# Patient Record
Sex: MALE | URBAN - METROPOLITAN AREA
[De-identification: names, ages, dates, MRNs, and addresses within clinical notes are randomized per-mention and may not be internally consistent; named-entity substitution may affect disease eponyms.]

---

## 2024-06-20 ENCOUNTER — LAB REQUISITION (OUTPATIENT)
Dept: LAB | Facility: HOSPITAL | Age: 74
End: 2024-06-20
Payer: COMMERCIAL

## 2024-06-20 DIAGNOSIS — Z79.01 LONG TERM (CURRENT) USE OF ANTICOAGULANTS: ICD-10-CM

## 2024-06-20 LAB
INR PPP: 3 (ref 0.9–1.1)
PROTHROMBIN TIME: 34.7 SECONDS (ref 9.8–12.8)

## 2024-06-20 PROCEDURE — 85610 PROTHROMBIN TIME: CPT | Mod: OUT | Performed by: INTERNAL MEDICINE

## 2024-10-21 ENCOUNTER — HOSPITAL ENCOUNTER (EMERGENCY)
Facility: HOSPITAL | Age: 74
Discharge: HOME | End: 2024-10-21
Attending: STUDENT IN AN ORGANIZED HEALTH CARE EDUCATION/TRAINING PROGRAM
Payer: COMMERCIAL

## 2024-10-21 ENCOUNTER — APPOINTMENT (OUTPATIENT)
Dept: CARDIOLOGY | Facility: HOSPITAL | Age: 74
End: 2024-10-21
Payer: COMMERCIAL

## 2024-10-21 ENCOUNTER — APPOINTMENT (OUTPATIENT)
Dept: RADIOLOGY | Facility: HOSPITAL | Age: 74
End: 2024-10-21
Payer: COMMERCIAL

## 2024-10-21 VITALS
BODY MASS INDEX: 27.09 KG/M2 | HEIGHT: 72 IN | WEIGHT: 200 LBS | DIASTOLIC BLOOD PRESSURE: 89 MMHG | RESPIRATION RATE: 18 BRPM | SYSTOLIC BLOOD PRESSURE: 156 MMHG | HEART RATE: 90 BPM | TEMPERATURE: 98.1 F | OXYGEN SATURATION: 98 %

## 2024-10-21 DIAGNOSIS — R53.1 GENERALIZED WEAKNESS: ICD-10-CM

## 2024-10-21 DIAGNOSIS — N30.00 ACUTE CYSTITIS WITHOUT HEMATURIA: Primary | ICD-10-CM

## 2024-10-21 LAB
ALBUMIN SERPL BCP-MCNC: 4.1 G/DL (ref 3.4–5)
ALP SERPL-CCNC: 71 U/L (ref 33–136)
ALT SERPL W P-5'-P-CCNC: 17 U/L (ref 10–52)
ANION GAP SERPL CALC-SCNC: 10 MMOL/L (ref 10–20)
APPEARANCE UR: CLEAR
APTT PPP: 51 SECONDS (ref 27–38)
AST SERPL W P-5'-P-CCNC: 18 U/L (ref 9–39)
BASOPHILS # BLD AUTO: 0.06 X10*3/UL (ref 0–0.1)
BASOPHILS NFR BLD AUTO: 0.8 %
BILIRUB SERPL-MCNC: 1.1 MG/DL (ref 0–1.2)
BILIRUB UR STRIP.AUTO-MCNC: NEGATIVE MG/DL
BUN SERPL-MCNC: 17 MG/DL (ref 6–23)
CALCIUM SERPL-MCNC: 9.4 MG/DL (ref 8.6–10.3)
CARDIAC TROPONIN I PNL SERPL HS: 7 NG/L (ref 0–20)
CARDIAC TROPONIN I PNL SERPL HS: 7 NG/L (ref 0–20)
CHLORIDE SERPL-SCNC: 101 MMOL/L (ref 98–107)
CO2 SERPL-SCNC: 29 MMOL/L (ref 21–32)
COLOR UR: YELLOW
CREAT SERPL-MCNC: 1.06 MG/DL (ref 0.5–1.3)
EGFRCR SERPLBLD CKD-EPI 2021: 74 ML/MIN/1.73M*2
EOSINOPHIL # BLD AUTO: 0.3 X10*3/UL (ref 0–0.4)
EOSINOPHIL NFR BLD AUTO: 3.9 %
ERYTHROCYTE [DISTWIDTH] IN BLOOD BY AUTOMATED COUNT: 14 % (ref 11.5–14.5)
FLUAV RNA RESP QL NAA+PROBE: NOT DETECTED
FLUBV RNA RESP QL NAA+PROBE: NOT DETECTED
GLUCOSE SERPL-MCNC: 92 MG/DL (ref 74–99)
GLUCOSE UR STRIP.AUTO-MCNC: NORMAL MG/DL
HCT VFR BLD AUTO: 43.4 % (ref 41–52)
HGB BLD-MCNC: 14.4 G/DL (ref 13.5–17.5)
HOLD SPECIMEN: NORMAL
HOLD SPECIMEN: NORMAL
IMM GRANULOCYTES # BLD AUTO: 0.02 X10*3/UL (ref 0–0.5)
IMM GRANULOCYTES NFR BLD AUTO: 0.3 % (ref 0–0.9)
KETONES UR STRIP.AUTO-MCNC: NEGATIVE MG/DL
LEUKOCYTE ESTERASE UR QL STRIP.AUTO: ABNORMAL
LIPASE SERPL-CCNC: 20 U/L (ref 9–82)
LYMPHOCYTES # BLD AUTO: 1.04 X10*3/UL (ref 0.8–3)
LYMPHOCYTES NFR BLD AUTO: 13.4 %
MAGNESIUM SERPL-MCNC: 1.96 MG/DL (ref 1.6–2.4)
MCH RBC QN AUTO: 31.4 PG (ref 26–34)
MCHC RBC AUTO-ENTMCNC: 33.2 G/DL (ref 32–36)
MCV RBC AUTO: 95 FL (ref 80–100)
MONOCYTES # BLD AUTO: 0.85 X10*3/UL (ref 0.05–0.8)
MONOCYTES NFR BLD AUTO: 11 %
MUCOUS THREADS #/AREA URNS AUTO: ABNORMAL /LPF
NEUTROPHILS # BLD AUTO: 5.47 X10*3/UL (ref 1.6–5.5)
NEUTROPHILS NFR BLD AUTO: 70.6 %
NITRITE UR QL STRIP.AUTO: NEGATIVE
NRBC BLD-RTO: 0 /100 WBCS (ref 0–0)
PH UR STRIP.AUTO: 5.5 [PH]
PLATELET # BLD AUTO: 164 X10*3/UL (ref 150–450)
POTASSIUM SERPL-SCNC: 4.4 MMOL/L (ref 3.5–5.3)
PROT SERPL-MCNC: 7 G/DL (ref 6.4–8.2)
PROT UR STRIP.AUTO-MCNC: NEGATIVE MG/DL
RBC # BLD AUTO: 4.58 X10*6/UL (ref 4.5–5.9)
RBC # UR STRIP.AUTO: NEGATIVE /UL
RBC #/AREA URNS AUTO: ABNORMAL /HPF
SARS-COV-2 RNA RESP QL NAA+PROBE: NOT DETECTED
SODIUM SERPL-SCNC: 136 MMOL/L (ref 136–145)
SP GR UR STRIP.AUTO: 1.01
UROBILINOGEN UR STRIP.AUTO-MCNC: NORMAL MG/DL
WBC # BLD AUTO: 7.7 X10*3/UL (ref 4.4–11.3)
WBC #/AREA URNS AUTO: ABNORMAL /HPF

## 2024-10-21 PROCEDURE — 84484 ASSAY OF TROPONIN QUANT: CPT | Performed by: EMERGENCY MEDICINE

## 2024-10-21 PROCEDURE — 93005 ELECTROCARDIOGRAM TRACING: CPT

## 2024-10-21 PROCEDURE — 85025 COMPLETE CBC W/AUTO DIFF WBC: CPT | Performed by: EMERGENCY MEDICINE

## 2024-10-21 PROCEDURE — 36415 COLL VENOUS BLD VENIPUNCTURE: CPT | Performed by: EMERGENCY MEDICINE

## 2024-10-21 PROCEDURE — 81001 URINALYSIS AUTO W/SCOPE: CPT | Performed by: EMERGENCY MEDICINE

## 2024-10-21 PROCEDURE — 87086 URINE CULTURE/COLONY COUNT: CPT | Mod: STJLAB | Performed by: EMERGENCY MEDICINE

## 2024-10-21 PROCEDURE — 84075 ASSAY ALKALINE PHOSPHATASE: CPT | Performed by: EMERGENCY MEDICINE

## 2024-10-21 PROCEDURE — 71045 X-RAY EXAM CHEST 1 VIEW: CPT | Mod: FOREIGN READ | Performed by: RADIOLOGY

## 2024-10-21 PROCEDURE — 87502 INFLUENZA DNA AMP PROBE: CPT | Performed by: STUDENT IN AN ORGANIZED HEALTH CARE EDUCATION/TRAINING PROGRAM

## 2024-10-21 PROCEDURE — 83690 ASSAY OF LIPASE: CPT | Performed by: EMERGENCY MEDICINE

## 2024-10-21 PROCEDURE — 2500000004 HC RX 250 GENERAL PHARMACY W/ HCPCS (ALT 636 FOR OP/ED): Performed by: STUDENT IN AN ORGANIZED HEALTH CARE EDUCATION/TRAINING PROGRAM

## 2024-10-21 PROCEDURE — 85730 THROMBOPLASTIN TIME PARTIAL: CPT | Performed by: EMERGENCY MEDICINE

## 2024-10-21 PROCEDURE — 96365 THER/PROPH/DIAG IV INF INIT: CPT

## 2024-10-21 PROCEDURE — 83735 ASSAY OF MAGNESIUM: CPT | Performed by: EMERGENCY MEDICINE

## 2024-10-21 PROCEDURE — 71045 X-RAY EXAM CHEST 1 VIEW: CPT

## 2024-10-21 PROCEDURE — 99284 EMERGENCY DEPT VISIT MOD MDM: CPT | Mod: 25

## 2024-10-21 RX ORDER — CEFTRIAXONE 1 G/50ML
1 INJECTION, SOLUTION INTRAVENOUS ONCE
Status: COMPLETED | OUTPATIENT
Start: 2024-10-21 | End: 2024-10-21

## 2024-10-21 RX ORDER — CIPROFLOXACIN 500 MG/1
500 TABLET ORAL 2 TIMES DAILY
Qty: 28 TABLET | Refills: 0 | Status: SHIPPED | OUTPATIENT
Start: 2024-10-21 | End: 2024-11-04

## 2024-10-21 ASSESSMENT — LIFESTYLE VARIABLES
HAVE PEOPLE ANNOYED YOU BY CRITICIZING YOUR DRINKING: NO
TOTAL SCORE: 0
EVER FELT BAD OR GUILTY ABOUT YOUR DRINKING: NO
HAVE YOU EVER FELT YOU SHOULD CUT DOWN ON YOUR DRINKING: NO
EVER HAD A DRINK FIRST THING IN THE MORNING TO STEADY YOUR NERVES TO GET RID OF A HANGOVER: NO

## 2024-10-21 ASSESSMENT — PAIN SCALES - GENERAL
PAINLEVEL_OUTOF10: 0 - NO PAIN

## 2024-10-21 ASSESSMENT — PAIN - FUNCTIONAL ASSESSMENT
PAIN_FUNCTIONAL_ASSESSMENT: 0-10
PAIN_FUNCTIONAL_ASSESSMENT: 0-10

## 2024-10-21 ASSESSMENT — COLUMBIA-SUICIDE SEVERITY RATING SCALE - C-SSRS
2. HAVE YOU ACTUALLY HAD ANY THOUGHTS OF KILLING YOURSELF?: NO
6. HAVE YOU EVER DONE ANYTHING, STARTED TO DO ANYTHING, OR PREPARED TO DO ANYTHING TO END YOUR LIFE?: NO
1. IN THE PAST MONTH, HAVE YOU WISHED YOU WERE DEAD OR WISHED YOU COULD GO TO SLEEP AND NOT WAKE UP?: NO

## 2024-10-21 NOTE — ED NOTES
Patient ambulated 50 feet with walker in decon room       Astrid Fuentes RN  10/21/24 2995     patient

## 2024-10-21 NOTE — ED PROVIDER NOTES
EMERGENCY DEPARTMENT ENCOUNTER      Pt Name: Arnoldo Madison  MRN: [id number]  Birthdate 1950  Date of evaluation: 10/21/2024  Provider: Fabio Clifton MD    CHIEF COMPLAINT       Chief Complaint   Patient presents with    Weakness, Gen     Patient brought to the ER due to due too increase weakness. Pt has a hx of seizure but no seizure today per report and patient. Patient alert and oriented X 4 at this time.      HISTORY OF PRESENT ILLNESS    Arnoldo Madison is a 74 y.o. year old male who presents to the ER for malaise in his skilled nursing facility.  Patient complains of feeling weak all over, especially in the legs and tearful for no reason.  The skilled nursing facility sent him over to be evaluated due to his generalized weakness, concern for fall risk.  The patient denies feeling dizzy, headaches, flulike symptoms, changes in bowel bladder habits.     Past medical history significant for stroke, hypertension, Marfan syndrome, seizures, cardiac valve replacement.  The patient is currently on Coumadin, lamotrigine, Zoloft.     PAST MEDICAL HISTORY     Past Medical History:   Diagnosis Date    Hypertension     Marfan syndrome ([facility])     Seizures (Multi)     Stroke (Multi)      CURRENT MEDICATIONS       Previous Medications    No medications on file     SURGICAL HISTORY       Past Surgical History:   Procedure Laterality Date    CARDIAC VALVE REPLACEMENT       ALLERGIES     Oxycodone  FAMILY HISTORY     No family history on file.  SOCIAL HISTORY       Social History     Tobacco Use    Smoking status: Former     Types: Cigarettes    Smokeless tobacco: Never   Substance Use Topics    Alcohol use: Not Currently    Drug use: Never     PHYSICAL EXAM  (up to 7 for level 4, 8 or more for level 5)     ED Triage Vitals [10/21/24 1159]   Temperature Heart Rate Respirations BP   36.6 °C (97.9 °F) 72 18 166/90      Pulse Ox Temp Source Heart Rate Source Patient Position   97 % Temporal Monitor Sitting      BP Location  FiO2 (%)     Right arm --       Physical Exam  Constitutional:       General: He is not in acute distress.     Appearance: Normal appearance. He is normal weight.   HENT:      Head: Normocephalic.      Nose: Nose normal.      Mouth/Throat:      Mouth: Mucous membranes are moist.   Eyes:      Extraocular Movements: Extraocular movements intact.      Conjunctiva/sclera: Conjunctivae normal.   Cardiovascular:      Rate and Rhythm: Normal rate and regular rhythm.      Pulses: Normal pulses.      Heart sounds: Normal heart sounds.   Pulmonary:      Effort: Pulmonary effort is normal.      Breath sounds: Normal breath sounds.   Abdominal:      General: Abdomen is flat. Bowel sounds are normal.      Palpations: Abdomen is soft.   Musculoskeletal:         General: Normal range of motion.      Cervical back: Normal range of motion and neck supple.   Skin:     General: Skin is warm and dry.   Neurological:      General: No focal deficit present.      Mental Status: He is alert and oriented to person, place, and time.      Motor: No weakness.        DIAGNOSTIC RESULTS   LABS:  Labs Reviewed   CBC WITH AUTO DIFFERENTIAL - Abnormal       Result Value    WBC 7.7      nRBC 0.0      RBC 4.58      Hemoglobin 14.4      Hematocrit 43.4      MCV 95      MCH 31.4      MCHC 33.2      RDW 14.0      Platelets 164      Neutrophils % 70.6      Immature Granulocytes %, Automated 0.3      Lymphocytes % 13.4      Monocytes % 11.0      Eosinophils % 3.9      Basophils % 0.8      Neutrophils Absolute 5.47      Immature Granulocytes Absolute, Automated 0.02      Lymphocytes Absolute 1.04      Monocytes Absolute 0.85 (*)     Eosinophils Absolute 0.30      Basophils Absolute 0.06     APTT - Abnormal    aPTT 51 (*)     Narrative:     The APTT is no longer used for monitoring Unfractionated Heparin Therapy. For monitoring Heparin Therapy, use the Heparin Assay.   URINALYSIS WITH REFLEX CULTURE AND MICROSCOPIC - Abnormal    Color, Urine Yellow       Appearance, Urine Clear      Specific Gravity, Urine 1.011      pH, Urine 5.5      Protein, Urine NEGATIVE      Glucose, Urine Normal      Blood, Urine NEGATIVE      Ketones, Urine NEGATIVE      Bilirubin, Urine NEGATIVE      Urobilinogen, Urine Normal      Nitrite, Urine NEGATIVE      Leukocyte Esterase, Urine 75 Benny/µL (*)    MICROSCOPIC ONLY, URINE - Abnormal    WBC, Urine 21-50 (*)     RBC, Urine NONE      Mucus, Urine FEW     COMPREHENSIVE METABOLIC PANEL - Normal    Glucose 92      Sodium 136      Potassium 4.4      Chloride 101      Bicarbonate 29      Anion Gap 10      Urea Nitrogen 17      Creatinine 1.06      eGFR 74      Calcium 9.4      Albumin 4.1      Alkaline Phosphatase 71      Total Protein 7.0      AST 18      Bilirubin, Total 1.1      ALT 17     MAGNESIUM - Normal    Magnesium 1.96     LIPASE - Normal    Lipase 20      Narrative:     Venipuncture immediately after or during the administration of Metamizole may lead to falsely low results. Testing should be performed immediately prior to Metamizole dosing.   SERIAL TROPONIN-INITIAL - Normal    Troponin I, High Sensitivity 7      Narrative:     Less than 99th percentile of normal range cutoff-  Female and children under 18 years old <14 ng/L; Male <21 ng/L: Negative  Repeat testing should be performed if clinically indicated.     Female and children under 18 years old 14-50 ng/L; Male 21-50 ng/L:  Consistent with possible cardiac damage and possible increased clinical   risk. Serial measurements may help to assess extent of myocardial damage.     >50 ng/L: Consistent with cardiac damage, increased clinical risk and  myocardial infarction. Serial measurements may help assess extent of   myocardial damage.      NOTE: Children less than 1 year old may have higher baseline troponin   levels and results should be interpreted in conjunction with the overall   clinical context.     NOTE: Troponin I testing is performed using a different   testing  methodology at [facility] than at [facility]. Direct result comparisons should only   be made within the same method.   SERIAL TROPONIN, 1 HOUR - Normal    Troponin I, High Sensitivity 7      Narrative:     Less than 99th percentile of normal range cutoff-  Female and children under 18 years old <14 ng/L; Male <21 ng/L: Negative  Repeat testing should be performed if clinically indicated.     Female and children under 18 years old 14-50 ng/L; Male 21-50 ng/L:  Consistent with possible cardiac damage and possible increased clinical   risk. Serial measurements may help to assess extent of myocardial damage.     >50 ng/L: Consistent with cardiac damage, increased clinical risk and  myocardial infarction. Serial measurements may help assess extent of   myocardial damage.      NOTE: Children less than 1 year old may have higher baseline troponin   levels and results should be interpreted in conjunction with the overall   clinical context.     NOTE: Troponin I testing is performed using a different   testing methodology at [facility] than at [facility]. Direct result comparisons should only   be made within the same method.   INFLUENZA A AND B PCR - Normal    Flu A Result Not Detected      Flu B Result Not Detected      Narrative:     This assay is an in vitro diagnostic multiplex nucleic acid amplification test for the detection and discrimination of Influenza A & B from nasopharyngeal specimens, and has been validated for use at [facility]. Negative results do not preclude Influenza A/B infections, and should not be used as the sole basis for diagnosis, treatment, or other management decisions. If Influenza A/B and RSV PCR results are negative, testing for Parainfluenza virus, Adenovirus and Metapneumovirus is routinely performed for [facility] pediatric oncology and intensive care inpatients, and is available on other patients by placing an add-on  request.   SARS-COV-2 PCR - Normal    Coronavirus 2019, PCR Not Detected      Narrative:     This assay has received FDA Emergency Use Authorization (EUA) and is only authorized for the duration of time that circumstances exist to justify the authorization of the emergency use of in vitro diagnostic tests for the detection of SARS-CoV-2 virus and/or diagnosis of COVID-19 infection under section 564(b)(1) of the Act, 21 U.S.C. 360bbb-3(b)(1). This assay is an in vitro diagnostic nucleic acid amplification test for the qualitative detection of SARS-CoV-2 from nasopharyngeal specimens and has been validated for use at [facility] Negative results do not preclude COVID-19 infections and should not be used as the sole basis for diagnosis, treatment, or other management decisions.     URINE CULTURE   TROPONIN SERIES- (INITIAL, 1 HR)    Narrative:     The following orders were created for panel order Troponin I Series, High Sensitivity (0, 1 HR).  Procedure                               Abnormality         Status                     ---------                               -----------         ------                     Troponin I, High Sensiti...[[id number]]  Normal              Final result               Troponin, High Sensitivi...[[id number]]  Normal              Final result                 Please view results for these tests on the individual orders.   URINE TUBE    Extra Tube Hold for add-ons.     URINALYSIS WITH REFLEX CULTURE AND MICROSCOPIC    Narrative:     The following orders were created for panel order Urinalysis with Reflex Culture and Microscopic.  Procedure                               Abnormality         Status                     ---------                               -----------         ------                     Urinalysis with Reflex C...[[id number]]  Abnormal            Final result               Extra Urine Gray Tube[[id number]]                            In process                    Please view results for these tests on the individual orders.   EXTRA URINE GRAY TUBE     All other labs were within normal range or not returned as of this dictation.  Imaging  XR chest 1 view   Final Result   Enlargement of the cardiomediastinal silhouette with sternotomy wires   and cardiac valvular prosthesis. No radiographic signs of active   pulmonary parenchymal infiltration.   Signed by Susie Barragan, DO         Procedure  Procedures  EMERGENCY DEPARTMENT COURSE/MDM:   Medical Decision Making  The patient is a 74-year-old male sent here from his skilled nursing for generalized malaise.  The differential diagnosis include dehydration, UTI, seizure, URI, MI, heart failure.    Physical exam is benign.  There was no evidence of head trauma, no tenderness to the C-spine.  No dizziness, his strength is symmetrical in the upper and lower extremities.  The patient is at baseline, alert and oriented.     CBC and CMP were normal, low clinical suspicion for dehydration.  EKG was normal, magnesium was normal, troponins were normal.  Unlikely that this is a MI or heart failure.  Urinalysis showed 75 leukocyte esterase, negative nitrites but due to the patient's symptoms and history the UTI is within reason to suspect.  The patient was given Rocephin here in the ED.     Patient was diagnosed with a complicated UTI due to his symptoms, physical exam, urinalysis.  Patient was discharged with a prescription for ciprofloxacin.  Patient was given return precautions and instructions to follow-up with the primary care provider in 1 week.    Amount and/or Complexity of Data Reviewed  Independent Historian: caregiver     Details: Sister       Vitals:    Vitals:    10/21/24 1228 10/21/24 1300 10/21/24 1531 10/21/24 1600   BP:  162/89 (!) 163/92 162/87   BP Location:  Left arm  Left arm   Patient Position:  Sitting  Sitting   Pulse:  71 94 92   Resp:  18 18 18   Temp:  36.7 °C (98.1 °F)  36.7 °C (98.1 °F)   TempSrc:  Temporal   Temporal   SpO2: 98% 98% 97% 97%   Weight:       Height:             ED Course as of 10/21/24 1710   Mon Oct 21, 2024   1241 EKG taken at 1242 on 21 October 2024 showing a flutter with variable block and a rate of 77 with frequent PVCs, normal axis, otherwise normal intervals, no acute ST elevation or depression [DS]   1447 EKG taken at 1337 on 21 October 2024 showing a flutter with normal rate of 80, frequent PVCs, normal axis, normal intervals, no acute ST elevation or depression [DS]      ED Course User Index  [DS] Carlos Velasco MD         Diagnoses as of 10/21/24 1710   Generalized weakness   Acute cystitis without hematuria           External Records Reviewed: I reviewed recent and relevant outside records including inpatient notes, outpatient records      Shared decision making for disposition  Patient and/or patient´s representative was counseled regarding labs, imaging, likely diagnosis. All questions were answered. Recommendation was made   for discharge home with return precautions.     ED Medications administered this visit:    Medications   cefTRIAXone (Rocephin) 1 g in dextrose (iso) IV 50 mL (0 g intravenous Stopped 10/21/24 1615)       New Prescriptions from this visit:    New Prescriptions    CIPROFLOXACIN (CIPRO) 500 MG TABLET    Take 1 tablet (500 mg) by mouth 2 times a day for 14 days.       Follow-up:  No follow-up provider specified.      Final Impression:   1. Acute cystitis without hematuria    2. Generalized weakness          Please excuse any misspellings or unintended errors related to the Dragon speech recognition software used to dictate this note.    I reviewed the case with the attending ED physician. The attending ED physician agrees with the plan.      Fabio Clifton MD  Resident  10/21/24 1710

## 2024-10-21 NOTE — DISCHARGE INSTRUCTIONS
Please return to the ED if you are peeing blood, if you have pain in your sides that doesn't get better with Tylenol or Motrin, if you faint, if you get pain bad enough to throw up. You were assessed for a urinary tract infection. Take the antibiotics twice a day for 14 days. Follow up with your PCP in 1 week.

## 2024-10-22 LAB
BACTERIA UR CULT: NORMAL
HOLD SPECIMEN: NORMAL

## 2024-10-27 LAB
ATRIAL RATE: 416 BPM
ATRIAL RATE: 53 BPM
Q ONSET: 207 MS
Q ONSET: 210 MS
QRS COUNT: 13 BEATS
QRS COUNT: 13 BEATS
QRS DURATION: 104 MS
QRS DURATION: 106 MS
QT INTERVAL: 400 MS
QT INTERVAL: 422 MS
QTC CALCULATION(BAZETT): 461 MS
QTC CALCULATION(BAZETT): 477 MS
QTC FREDERICIA: 440 MS
QTC FREDERICIA: 458 MS
R AXIS: 44 DEGREES
R AXIS: 55 DEGREES
T AXIS: 45 DEGREES
T AXIS: 59 DEGREES
T OFFSET: 410 MS
T OFFSET: 418 MS
VENTRICULAR RATE: 77 BPM
VENTRICULAR RATE: 80 BPM

## 2024-11-14 ENCOUNTER — NURSING HOME VISIT (OUTPATIENT)
Dept: POST ACUTE CARE | Facility: EXTERNAL LOCATION | Age: 74
End: 2024-11-14
Payer: COMMERCIAL

## 2024-11-14 VITALS
HEART RATE: 72 BPM | WEIGHT: 199 LBS | TEMPERATURE: 97.7 F | BODY MASS INDEX: 26.99 KG/M2 | SYSTOLIC BLOOD PRESSURE: 118 MMHG | OXYGEN SATURATION: 98 % | RESPIRATION RATE: 18 BRPM | DIASTOLIC BLOOD PRESSURE: 71 MMHG

## 2024-11-14 DIAGNOSIS — Z95.2 S/P AVR (AORTIC VALVE REPLACEMENT): ICD-10-CM

## 2024-11-14 DIAGNOSIS — M25.569 CHRONIC KNEE PAIN, UNSPECIFIED LATERALITY: ICD-10-CM

## 2024-11-14 DIAGNOSIS — R56.9 SEIZURE (MULTI): Primary | ICD-10-CM

## 2024-11-14 DIAGNOSIS — R29.6 FALLS: ICD-10-CM

## 2024-11-14 DIAGNOSIS — G89.29 CHRONIC KNEE PAIN, UNSPECIFIED LATERALITY: ICD-10-CM

## 2024-11-14 DIAGNOSIS — I48.20 CHRONIC ATRIAL FIBRILLATION (MULTI): ICD-10-CM

## 2024-11-14 DIAGNOSIS — E03.9 HYPOTHYROIDISM, UNSPECIFIED TYPE: ICD-10-CM

## 2024-11-14 PROCEDURE — 99310 SBSQ NF CARE HIGH MDM 45: CPT | Performed by: NURSE PRACTITIONER

## 2024-11-14 NOTE — LETTER
Patient: Arnoldo Madison  : 1950    Encounter Date: 2024    Subjective  Arnoldo Madison is a 74 y.o. male Here for skilled admission due to falls and seizures.   HPI   He resides in [facility] and had his lamictal dose recently increased.  He has had a few falls in the last few weeks and had increased weakness prompting hospital evaluation.  No known injuries from falls.   No acute pathology identified in the hospital and lamictal dose was decreased.  He had a seziure in the hospital after dose reduction, evaluated by neurology and lamictal dose increased.  He had an upcoming knee replacement scheduled which has been postponed.   TSH was elevated and levothyroxine dose was increased.   Past medical hx includes Marfan syndrome, Aortic dissection and repair , AVR on coumadin, AF, seizures, subdurla hemorrhage with memory loss.  He has been on disability for his adult life.  Poor historian.         MEDS:  Artificial tears  Ascorbic acid  Atorvastatin  Vitamin D  Ketorolac eye gtts  Lamictal (dose increased)  Latanoprost  MVI  Timolol  Zinc  Levothyroxine (dose increased)  Metoprolol tartrate  Coumadin    Labs:    WBC: 8.33  Hgb: 13.5  Platelet: 157    Na: 137  K: 4.0  Cl: 102  Co2: 24  BUN: 16  Creatinine: 1.02      Review of Systems   Constitutional:  Negative for chills, fatigue and fever.   Respiratory:  Negative for cough and shortness of breath.    Cardiovascular:  Negative for chest pain and palpitations.   Gastrointestinal:  Negative for abdominal pain, constipation, diarrhea, nausea and vomiting.   Genitourinary:  Negative for difficulty urinating.       Objective  /71   Pulse 72   Temp 36.5 °C (97.7 °F)   Resp 18   Wt 90.3 kg (199 lb)   SpO2 98%   BMI 26.99 kg/m²     Physical Exam  Constitutional:       General: He is not in acute distress.     Comments: Tall, thin. Multiple ecchymotic areas noted.    HENT:      Head: Normocephalic and atraumatic.   Eyes:      Conjunctiva/sclera:  Conjunctivae normal.   Cardiovascular:      Rate and Rhythm: Normal rate and regular rhythm.      Comments: Aortic click noted.   Pulmonary:      Effort: Pulmonary effort is normal. No respiratory distress.      Breath sounds: Normal breath sounds.   Abdominal:      General: Bowel sounds are normal. There is no distension.      Palpations: Abdomen is soft.      Tenderness: There is no abdominal tenderness.   Musculoskeletal:         General: Normal range of motion.      Right lower leg: No edema.      Left lower leg: No edema.   Skin:     General: Skin is warm and dry.   Neurological:      General: No focal deficit present.      Mental Status: He is alert.      Comments: Poor historian   Psychiatric:         Mood and Affect: Mood normal.         Behavior: Behavior normal.         Assessment & Plan  Seizure (Multi)  He had a recent increase in lamictal dose and was having increased falls at [facility].  He was admitted to the hospital and lamictal dose was decreased, he had a subsequent seizure and lamictal dose was increased again.  No known seizures since admission, staff to monitor and notify for any seizure activity.   S/P AVR (aortic valve replacement)  On coumadin, INR every Monday and Thursday for 2 weeks ordered, no sx of bleeding.   He has a hx of recent falls which is concerning in the setting of ac.   Falls  He was having multiple falls at [facility] after recent increase in lamictal.  The lamictal dose was decreased and he was feeling better but had a seizure in the hospital, lamictal dose was increased back up by neurology.   Chronic atrial fibrillation (Multi)  On coumadin and metoprolol.  INR ordered every Monday and Thursday for 2 weeks.   Adjust coumadin dose based on INR results.   Hypothyroidism, unspecified type  TSH was high, levothyroxine dose increased and repeat TSH ordered for 1/6/25.   Chronic knee pain, unspecified laterality  He had TKR scheduled in the near future, this has been postponed.       labs/meds/orders reviewed  staff to monitor and notify for any changes.  Hospital records reviewed.  continue with therapy as able.  Pt/inr every Monday and Thursday for 2 weeks  Staff to monitor for any seizures  Follow up with orthopedics re knee surgery  [facility] 1/6/25  Time for coordination of care was greater than 35 minutes with hospital chart review, visit and exam, discussion of treatment plan with patient and also discussion of case with staff.        Electronically Signed By: ARTURO Sarah   11/17/24  8:46 PM

## 2024-11-14 NOTE — PROGRESS NOTES
Subjective   Arnoldo Madison is a 74 y.o. male Here for skilled admission due to falls and seizures.   HPI   He resides in [facility] and had his lamictal dose recently increased.  He has had a few falls in the last few weeks and had increased weakness prompting hospital evaluation.  No known injuries from falls.   No acute pathology identified in the hospital and lamictal dose was decreased.  He had a seziure in the hospital after dose reduction, evaluated by neurology and lamictal dose increased.  He had an upcoming knee replacement scheduled which has been postponed.   TSH was elevated and levothyroxine dose was increased.   Past medical hx includes Marfan syndrome, Aortic dissection and repair 2000, AVR on coumadin, AF, seizures, subdurla hemorrhage with memory loss.  He has been on disability for his adult life.  Poor historian.         MEDS:  Artificial tears  Ascorbic acid  Atorvastatin  Vitamin D  Ketorolac eye gtts  Lamictal (dose increased)  Latanoprost  MVI  Timolol  Zinc  Levothyroxine (dose increased)  Metoprolol tartrate  Coumadin    Labs:  11/6  WBC: 8.33  Hgb: 13.5  Platelet: 157    Na: 137  K: 4.0  Cl: 102  Co2: 24  BUN: 16  Creatinine: 1.02      Review of Systems   Constitutional:  Negative for chills, fatigue and fever.   Respiratory:  Negative for cough and shortness of breath.    Cardiovascular:  Negative for chest pain and palpitations.   Gastrointestinal:  Negative for abdominal pain, constipation, diarrhea, nausea and vomiting.   Genitourinary:  Negative for difficulty urinating.       Objective   /71   Pulse 72   Temp 36.5 °C (97.7 °F)   Resp 18   Wt 90.3 kg (199 lb)   SpO2 98%   BMI 26.99 kg/m²     Physical Exam  Constitutional:       General: He is not in acute distress.     Comments: Tall, thin. Multiple ecchymotic areas noted.    HENT:      Head: Normocephalic and atraumatic.   Eyes:      Conjunctiva/sclera: Conjunctivae normal.   Cardiovascular:      Rate and Rhythm: Normal rate and  regular rhythm.      Comments: Aortic click noted.   Pulmonary:      Effort: Pulmonary effort is normal. No respiratory distress.      Breath sounds: Normal breath sounds.   Abdominal:      General: Bowel sounds are normal. There is no distension.      Palpations: Abdomen is soft.      Tenderness: There is no abdominal tenderness.   Musculoskeletal:         General: Normal range of motion.      Right lower leg: No edema.      Left lower leg: No edema.   Skin:     General: Skin is warm and dry.   Neurological:      General: No focal deficit present.      Mental Status: He is alert.      Comments: Poor historian   Psychiatric:         Mood and Affect: Mood normal.         Behavior: Behavior normal.         Assessment & Plan  Seizure (Multi)  He had a recent increase in lamictal dose and was having increased falls at [facility].  He was admitted to the hospital and lamictal dose was decreased, he had a subsequent seizure and lamictal dose was increased again.  No known seizures since admission, staff to monitor and notify for any seizure activity.   S/P AVR (aortic valve replacement)  On coumadin, INR every Monday and Thursday for 2 weeks ordered, no sx of bleeding.   He has a hx of recent falls which is concerning in the setting of ac.   Falls  He was having multiple falls at [facility] after recent increase in lamictal.  The lamictal dose was decreased and he was feeling better but had a seizure in the hospital, lamictal dose was increased back up by neurology.   Chronic atrial fibrillation (Multi)  On coumadin and metoprolol.  INR ordered every Monday and Thursday for 2 weeks.   Adjust coumadin dose based on INR results.   Hypothyroidism, unspecified type  TSH was high, levothyroxine dose increased and repeat TSH ordered for 1/6/25.   Chronic knee pain, unspecified laterality  He had TKR scheduled in the near future, this has been postponed.      labs/meds/orders reviewed  staff to monitor and notify for any changes.  Hospital  records reviewed.  continue with therapy as able.  Pt/inr every Monday and Thursday for 2 weeks  Staff to monitor for any seizures  Follow up with orthopedics re knee surgery  TSH 1/6/25  Time for coordination of care was greater than 35 minutes with hospital chart review, visit and exam, discussion of treatment plan with patient and also discussion of case with staff.

## 2024-11-15 ENCOUNTER — NURSING HOME VISIT (OUTPATIENT)
Dept: POST ACUTE CARE | Facility: EXTERNAL LOCATION | Age: 74
End: 2024-11-15
Payer: COMMERCIAL

## 2024-11-15 VITALS
WEIGHT: 199 LBS | OXYGEN SATURATION: 97 % | DIASTOLIC BLOOD PRESSURE: 82 MMHG | SYSTOLIC BLOOD PRESSURE: 146 MMHG | BODY MASS INDEX: 26.99 KG/M2 | RESPIRATION RATE: 18 BRPM | HEART RATE: 88 BPM

## 2024-11-15 DIAGNOSIS — G89.29 CHRONIC KNEE PAIN, UNSPECIFIED LATERALITY: ICD-10-CM

## 2024-11-15 DIAGNOSIS — Q87.40 MARFAN SYNDROME (HHS-HCC): ICD-10-CM

## 2024-11-15 DIAGNOSIS — M25.569 CHRONIC KNEE PAIN, UNSPECIFIED LATERALITY: ICD-10-CM

## 2024-11-15 DIAGNOSIS — E03.9 ACQUIRED HYPOTHYROIDISM: ICD-10-CM

## 2024-11-15 DIAGNOSIS — R29.6 FALLS: ICD-10-CM

## 2024-11-15 DIAGNOSIS — I48.20 CHRONIC ATRIAL FIBRILLATION (MULTI): ICD-10-CM

## 2024-11-15 DIAGNOSIS — R56.9 SEIZURE (MULTI): Primary | ICD-10-CM

## 2024-11-15 PROCEDURE — 99306 1ST NF CARE HIGH MDM 50: CPT | Performed by: INTERNAL MEDICINE

## 2024-11-15 NOTE — PROGRESS NOTES
Subjective   Patient ID: Abram Madison is a 74 y.o. male who is acute skilled care being seen and evaluated for multiple medical problems.    HPI   Arnoldo Madison is a 74 y.o. male Here for skilled admission due to falls and seizures.   HPI   He resides in [facility] and had his lamictal dose recently increased.  He has had a few falls in the last few weeks and had increased weakness prompting hospital evaluation.  No known injuries from falls.   No acute pathology identified in the hospital and lamictal dose was decreased.  He had a seziure in the hospital after dose reduction, evaluated by neurology and lamictal dose increased.  He had an upcoming knee replacement scheduled which has been postponed.   TSH was elevated and levothyroxine dose was increased.   Past medical hx includes Marfan syndrome, Aortic dissection and repair 2000, AVR on coumadin, AF, seizures, subdurla hemorrhage with memory loss.  He has been on disability for his adult life.  Poor historian.      The patient is resting comfortably in no distress.  As above he is here for rehabilitation following exacerbation of seizure disorder.  He has tried to prepare for knee replacement surgery.  Nursing has no adverse events reported to me at this time any scheduled for follow-up with the epilepsy clinic.       MEDS:  Artificial tears  Ascorbic acid  Atorvastatin  Vitamin D  Ketorolac eye gtts  Lamictal (dose increased)  Latanoprost  MVI  Timolol  Zinc  Levothyroxine (dose increased)  Metoprolol tartrate  Coumadin     Labs:  11/6  WBC: 8.33  Hgb: 13.5  Platelet: 157     Na: 137  K: 4.0  Cl: 102  Co2: 24  BUN: 16  Creatinine: 1.02    Albumin 4.0       Review of Systems   Constitutional:  Negative for chills, fatigue and fever.   Respiratory:  Negative for cough and shortness of breath.    Cardiovascular:  Negative for chest pain and palpitations.   Gastrointestinal:  Negative for abdominal pain, constipation, diarrhea, nausea and vomiting.   Genitourinary:   Negative for difficulty urinating.       Objective   /82   Pulse 88   Resp 18   Wt 90.3 kg (199 lb)   SpO2 97%   BMI 26.99 kg/m²     Physical Exam  Constitutional:       General: He is not in acute distress.     Comments: Tall, thin. Multiple ecchymotic areas noted.    HENT:      Head: Normocephalic and atraumatic.      Mouth/Throat:      Mouth: Mucous membranes are moist.   Eyes:      Conjunctiva/sclera: Conjunctivae normal.   Cardiovascular:      Rate and Rhythm: Normal rate and regular rhythm.      Comments: Aortic click noted.   Pulmonary:      Effort: Pulmonary effort is normal. No respiratory distress.      Breath sounds: Normal breath sounds.   Abdominal:      General: Bowel sounds are normal. There is no distension.      Palpations: Abdomen is soft.      Tenderness: There is no abdominal tenderness.   Musculoskeletal:         General: Normal range of motion.      Right lower leg: No edema.      Left lower leg: No edema.   Skin:     General: Skin is warm and dry.   Neurological:      General: No focal deficit present.      Mental Status: He is alert.      Comments: Poor historian, the patient does have the appearance of bradykinesia and masked facies but no tremors appreciated.   Psychiatric:         Mood and Affect: Mood normal.         Behavior: Behavior normal.         Assessment/Plan   Problem List Items Addressed This Visit             ICD-10-CM    Falls R29.6    Seizure (Multi) - Primary R56.9    Hypothyroidism E03.9    Marfan syndrome ([facility]) Q87.40    Chronic atrial fibrillation (Multi) I48.20    Chronic knee pain M25.569, G89.29     A.  We will continue with rehabilitative restorative and supportive care as the patient tolerates    B.  Laboratory examinations we monitored on an ongoing as needed basis    C.  The patient is scheduled to follow-up in the epilepsy clinic with regard to his recently unstable seizure disorder    D.  Disposition will be pending response to rehabilitation  overall assessment patient safety awareness    F.  The patient does have some of the cardinal physical findings of parkinsonism.  He is currently not prescribed antipsychotic medication but it is unknown whether or not he is had experience with this class of medication throughout his life.  Will defer further workup and treatment to his neurologist.    G.  The patient's prognosis is guarded.

## 2024-11-15 NOTE — LETTER
Patient: Abram Madison  : 1950    Encounter Date: 11/15/2024    Subjective  Patient ID: Abram Madison is a 74 y.o. male who is acute skilled care being seen and evaluated for multiple medical problems.    HPI   Arnoldo Madison is a 74 y.o. male Here for skilled admission due to falls and seizures.   HPI   He resides in [facility] and had his lamictal dose recently increased.  He has had a few falls in the last few weeks and had increased weakness prompting hospital evaluation.  No known injuries from falls.   No acute pathology identified in the hospital and lamictal dose was decreased.  He had a seziure in the hospital after dose reduction, evaluated by neurology and lamictal dose increased.  He had an upcoming knee replacement scheduled which has been postponed.   TSH was elevated and levothyroxine dose was increased.   Past medical hx includes Marfan syndrome, Aortic dissection and repair , AVR on coumadin, AF, seizures, subdurla hemorrhage with memory loss.  He has been on disability for his adult life.  Poor historian.      The patient is resting comfortably in no distress.  As above he is here for rehabilitation following exacerbation of seizure disorder.  He has tried to prepare for knee replacement surgery.  Nursing has no adverse events reported to me at this time any scheduled for follow-up with the epilepsy clinic.       MEDS:  Artificial tears  Ascorbic acid  Atorvastatin  Vitamin D  Ketorolac eye gtts  Lamictal (dose increased)  Latanoprost  MVI  Timolol  Zinc  Levothyroxine (dose increased)  Metoprolol tartrate  Coumadin     Labs:    WBC: 8.33  Hgb: 13.5  Platelet: 157     Na: 137  K: 4.0  Cl: 102  Co2: 24  BUN: 16  Creatinine: 1.02    Albumin 4.0       Review of Systems   Constitutional:  Negative for chills, fatigue and fever.   Respiratory:  Negative for cough and shortness of breath.    Cardiovascular:  Negative for chest pain and palpitations.   Gastrointestinal:  Negative for abdominal pain,  constipation, diarrhea, nausea and vomiting.   Genitourinary:  Negative for difficulty urinating.       Objective  /82   Pulse 88   Resp 18   Wt 90.3 kg (199 lb)   SpO2 97%   BMI 26.99 kg/m²     Physical Exam  Constitutional:       General: He is not in acute distress.     Comments: Tall, thin. Multiple ecchymotic areas noted.    HENT:      Head: Normocephalic and atraumatic.      Mouth/Throat:      Mouth: Mucous membranes are moist.   Eyes:      Conjunctiva/sclera: Conjunctivae normal.   Cardiovascular:      Rate and Rhythm: Normal rate and regular rhythm.      Comments: Aortic click noted.   Pulmonary:      Effort: Pulmonary effort is normal. No respiratory distress.      Breath sounds: Normal breath sounds.   Abdominal:      General: Bowel sounds are normal. There is no distension.      Palpations: Abdomen is soft.      Tenderness: There is no abdominal tenderness.   Musculoskeletal:         General: Normal range of motion.      Right lower leg: No edema.      Left lower leg: No edema.   Skin:     General: Skin is warm and dry.   Neurological:      General: No focal deficit present.      Mental Status: He is alert.      Comments: Poor historian, the patient does have the appearance of bradykinesia and masked facies but no tremors appreciated.   Psychiatric:         Mood and Affect: Mood normal.         Behavior: Behavior normal.         Assessment/Plan  Problem List Items Addressed This Visit             ICD-10-CM    Falls R29.6    Seizure (Multi) - Primary R56.9    Hypothyroidism E03.9    Marfan syndrome ([facility]) Q87.40    Chronic atrial fibrillation (Multi) I48.20    Chronic knee pain M25.569, G89.29     A.  We will continue with rehabilitative restorative and supportive care as the patient tolerates    B.  Laboratory examinations we monitored on an ongoing as needed basis    C.  The patient is scheduled to follow-up in the epilepsy clinic with regard to his recently unstable seizure disorder    D.   Disposition will be pending response to rehabilitation overall assessment patient safety awareness    F.  The patient does have some of the cardinal physical findings of parkinsonism.  He is currently not prescribed antipsychotic medication but it is unknown whether or not he is had experience with this class of medication throughout his life.  Will defer further workup and treatment to his neurologist.    G.  The patient's prognosis is guarded.        Electronically Signed By: Bhupinder Jeff MD   11/25/24  4:22 PM

## 2024-11-17 PROBLEM — G89.29 CHRONIC KNEE PAIN: Status: ACTIVE | Noted: 2024-11-17

## 2024-11-17 PROBLEM — S06.5XAA SDH (SUBDURAL HEMATOMA) (MULTI): Status: ACTIVE | Noted: 2024-11-17

## 2024-11-17 PROBLEM — I48.20 CHRONIC ATRIAL FIBRILLATION (MULTI): Status: ACTIVE | Noted: 2024-11-17

## 2024-11-17 PROBLEM — M25.569 CHRONIC KNEE PAIN: Status: ACTIVE | Noted: 2024-11-17

## 2024-11-17 PROBLEM — R29.6 FALLS: Status: ACTIVE | Noted: 2024-11-17

## 2024-11-17 PROBLEM — Q87.40 MARFAN SYNDROME (HHS-HCC): Status: ACTIVE | Noted: 2024-11-17

## 2024-11-17 PROBLEM — Z95.2 S/P AVR (AORTIC VALVE REPLACEMENT): Status: ACTIVE | Noted: 2024-11-17

## 2024-11-17 PROBLEM — R56.9 SEIZURE (MULTI): Status: ACTIVE | Noted: 2024-11-17

## 2024-11-17 PROBLEM — E03.9 HYPOTHYROIDISM: Status: ACTIVE | Noted: 2024-11-17

## 2024-11-17 ASSESSMENT — ENCOUNTER SYMPTOMS
ABDOMINAL PAIN: 0
PALPITATIONS: 0
CONSTIPATION: 0
SHORTNESS OF BREATH: 0
VOMITING: 0
DIARRHEA: 0
FEVER: 0
DIFFICULTY URINATING: 0
CHILLS: 0
COUGH: 0
NAUSEA: 0
FATIGUE: 0

## 2024-11-18 NOTE — ASSESSMENT & PLAN NOTE
He had a recent increase in lamictal dose and was having increased falls at [facility].  He was admitted to the hospital and lamictal dose was decreased, he had a subsequent seizure and lamictal dose was increased again.  No known seizures since admission, staff to monitor and notify for any seizure activity.

## 2024-11-18 NOTE — ASSESSMENT & PLAN NOTE
On coumadin, INR every Monday and Thursday for 2 weeks ordered, no sx of bleeding.   He has a hx of recent falls which is concerning in the setting of ac.

## 2024-11-18 NOTE — ASSESSMENT & PLAN NOTE
He was having multiple falls at [facility] after recent increase in lamictal.  The lamictal dose was decreased and he was feeling better but had a seizure in the hospital, lamictal dose was increased back up by neurology.

## 2024-11-18 NOTE — ASSESSMENT & PLAN NOTE
On coumadin and metoprolol.  INR ordered every Monday and Thursday for 2 weeks.   Adjust coumadin dose based on INR results.

## 2024-11-25 ASSESSMENT — ENCOUNTER SYMPTOMS
ABDOMINAL PAIN: 0
SHORTNESS OF BREATH: 0
NAUSEA: 0
COUGH: 0
PALPITATIONS: 0
DIARRHEA: 0
FEVER: 0
CHILLS: 0
DIFFICULTY URINATING: 0
CONSTIPATION: 0
FATIGUE: 0
VOMITING: 0

## 2025-01-07 ENCOUNTER — APPOINTMENT (OUTPATIENT)
Dept: CARDIOLOGY | Facility: HOSPITAL | Age: 75
End: 2025-01-07
Payer: COMMERCIAL

## 2025-01-07 ENCOUNTER — HOSPITAL ENCOUNTER (EMERGENCY)
Facility: HOSPITAL | Age: 75
Discharge: HOME | End: 2025-01-08
Attending: STUDENT IN AN ORGANIZED HEALTH CARE EDUCATION/TRAINING PROGRAM
Payer: COMMERCIAL

## 2025-01-07 ENCOUNTER — APPOINTMENT (OUTPATIENT)
Dept: RADIOLOGY | Facility: HOSPITAL | Age: 75
End: 2025-01-07
Payer: COMMERCIAL

## 2025-01-07 DIAGNOSIS — R41.82 ALTERED MENTAL STATUS, UNSPECIFIED ALTERED MENTAL STATUS TYPE: Primary | ICD-10-CM

## 2025-01-07 LAB
ALBUMIN SERPL BCP-MCNC: 3.4 G/DL (ref 3.4–5)
ALP SERPL-CCNC: 98 U/L (ref 33–136)
ALT SERPL W P-5'-P-CCNC: 12 U/L (ref 10–52)
ANION GAP SERPL CALC-SCNC: 15 MMOL/L (ref 10–20)
APPEARANCE UR: CLEAR
APTT PPP: 50 SECONDS (ref 27–38)
AST SERPL W P-5'-P-CCNC: 27 U/L (ref 9–39)
BASOPHILS # BLD AUTO: 0.02 X10*3/UL (ref 0–0.1)
BASOPHILS NFR BLD AUTO: 0.2 %
BILIRUB SERPL-MCNC: 0.9 MG/DL (ref 0–1.2)
BILIRUB UR STRIP.AUTO-MCNC: NEGATIVE MG/DL
BNP SERPL-MCNC: 62 PG/ML (ref 0–99)
BUN SERPL-MCNC: 15 MG/DL (ref 6–23)
CALCIUM SERPL-MCNC: 8.6 MG/DL (ref 8.6–10.3)
CARDIAC TROPONIN I PNL SERPL HS: 19 NG/L (ref 0–20)
CARDIAC TROPONIN I PNL SERPL HS: 21 NG/L (ref 0–20)
CHLORIDE SERPL-SCNC: 108 MMOL/L (ref 98–107)
CO2 SERPL-SCNC: 27 MMOL/L (ref 21–32)
COLOR UR: YELLOW
CREAT SERPL-MCNC: 0.78 MG/DL (ref 0.5–1.3)
EGFRCR SERPLBLD CKD-EPI 2021: >90 ML/MIN/1.73M*2
EOSINOPHIL # BLD AUTO: 0.18 X10*3/UL (ref 0–0.4)
EOSINOPHIL NFR BLD AUTO: 2.2 %
ERYTHROCYTE [DISTWIDTH] IN BLOOD BY AUTOMATED COUNT: 15.7 % (ref 11.5–14.5)
FLUAV RNA RESP QL NAA+PROBE: NOT DETECTED
FLUBV RNA RESP QL NAA+PROBE: NOT DETECTED
GLUCOSE SERPL-MCNC: 93 MG/DL (ref 74–99)
GLUCOSE UR STRIP.AUTO-MCNC: NORMAL MG/DL
HCT VFR BLD AUTO: 34.5 % (ref 41–52)
HGB BLD-MCNC: 10.5 G/DL (ref 13.5–17.5)
IMM GRANULOCYTES # BLD AUTO: 0.03 X10*3/UL (ref 0–0.5)
IMM GRANULOCYTES NFR BLD AUTO: 0.4 % (ref 0–0.9)
INR PPP: 4.1 (ref 0.9–1.1)
KETONES UR STRIP.AUTO-MCNC: ABNORMAL MG/DL
LACTATE SERPL-SCNC: 1 MMOL/L (ref 0.4–2)
LAMOTRIGINE SERPL-MCNC: 8.8 UG/ML (ref 2.5–15)
LEUKOCYTE ESTERASE UR QL STRIP.AUTO: NEGATIVE
LYMPHOCYTES # BLD AUTO: 0.53 X10*3/UL (ref 0.8–3)
LYMPHOCYTES NFR BLD AUTO: 6.3 %
MCH RBC QN AUTO: 28 PG (ref 26–34)
MCHC RBC AUTO-ENTMCNC: 30.4 G/DL (ref 32–36)
MCV RBC AUTO: 92 FL (ref 80–100)
MONOCYTES # BLD AUTO: 0.63 X10*3/UL (ref 0.05–0.8)
MONOCYTES NFR BLD AUTO: 7.5 %
NEUTROPHILS # BLD AUTO: 6.97 X10*3/UL (ref 1.6–5.5)
NEUTROPHILS NFR BLD AUTO: 83.4 %
NITRITE UR QL STRIP.AUTO: NEGATIVE
NRBC BLD-RTO: 0 /100 WBCS (ref 0–0)
PH UR STRIP.AUTO: 5.5 [PH]
PLATELET # BLD AUTO: 220 X10*3/UL (ref 150–450)
POTASSIUM SERPL-SCNC: 3.9 MMOL/L (ref 3.5–5.3)
PROT SERPL-MCNC: 6.3 G/DL (ref 6.4–8.2)
PROT UR STRIP.AUTO-MCNC: ABNORMAL MG/DL
PROTHROMBIN TIME: 46.6 SECONDS (ref 9.8–12.8)
RBC # BLD AUTO: 3.75 X10*6/UL (ref 4.5–5.9)
RBC # UR STRIP.AUTO: NEGATIVE /UL
RBC #/AREA URNS AUTO: ABNORMAL /HPF
SARS-COV-2 RNA RESP QL NAA+PROBE: NOT DETECTED
SODIUM SERPL-SCNC: 146 MMOL/L (ref 136–145)
SP GR UR STRIP.AUTO: 1.03
UROBILINOGEN UR STRIP.AUTO-MCNC: NORMAL MG/DL
WBC # BLD AUTO: 8.4 X10*3/UL (ref 4.4–11.3)
WBC #/AREA URNS AUTO: ABNORMAL /HPF

## 2025-01-07 PROCEDURE — 85025 COMPLETE CBC W/AUTO DIFF WBC: CPT | Performed by: STUDENT IN AN ORGANIZED HEALTH CARE EDUCATION/TRAINING PROGRAM

## 2025-01-07 PROCEDURE — 85730 THROMBOPLASTIN TIME PARTIAL: CPT

## 2025-01-07 PROCEDURE — 84484 ASSAY OF TROPONIN QUANT: CPT | Performed by: STUDENT IN AN ORGANIZED HEALTH CARE EDUCATION/TRAINING PROGRAM

## 2025-01-07 PROCEDURE — 83880 ASSAY OF NATRIURETIC PEPTIDE: CPT | Performed by: STUDENT IN AN ORGANIZED HEALTH CARE EDUCATION/TRAINING PROGRAM

## 2025-01-07 PROCEDURE — 83605 ASSAY OF LACTIC ACID: CPT

## 2025-01-07 PROCEDURE — 93005 ELECTROCARDIOGRAM TRACING: CPT

## 2025-01-07 PROCEDURE — 96361 HYDRATE IV INFUSION ADD-ON: CPT | Mod: 59

## 2025-01-07 PROCEDURE — 70450 CT HEAD/BRAIN W/O DYE: CPT | Performed by: STUDENT IN AN ORGANIZED HEALTH CARE EDUCATION/TRAINING PROGRAM

## 2025-01-07 PROCEDURE — 96360 HYDRATION IV INFUSION INIT: CPT | Mod: 59

## 2025-01-07 PROCEDURE — 71045 X-RAY EXAM CHEST 1 VIEW: CPT

## 2025-01-07 PROCEDURE — 80175 DRUG SCREEN QUAN LAMOTRIGINE: CPT | Mod: STJLAB

## 2025-01-07 PROCEDURE — 70450 CT HEAD/BRAIN W/O DYE: CPT

## 2025-01-07 PROCEDURE — 96367 TX/PROPH/DG ADDL SEQ IV INF: CPT

## 2025-01-07 PROCEDURE — 80053 COMPREHEN METABOLIC PANEL: CPT | Performed by: STUDENT IN AN ORGANIZED HEALTH CARE EDUCATION/TRAINING PROGRAM

## 2025-01-07 PROCEDURE — 36415 COLL VENOUS BLD VENIPUNCTURE: CPT

## 2025-01-07 PROCEDURE — 87636 SARSCOV2 & INF A&B AMP PRB: CPT | Performed by: STUDENT IN AN ORGANIZED HEALTH CARE EDUCATION/TRAINING PROGRAM

## 2025-01-07 PROCEDURE — 71045 X-RAY EXAM CHEST 1 VIEW: CPT | Performed by: RADIOLOGY

## 2025-01-07 PROCEDURE — 2500000004 HC RX 250 GENERAL PHARMACY W/ HCPCS (ALT 636 FOR OP/ED)

## 2025-01-07 PROCEDURE — 87040 BLOOD CULTURE FOR BACTERIA: CPT | Mod: STJLAB

## 2025-01-07 PROCEDURE — 96366 THER/PROPH/DIAG IV INF ADDON: CPT

## 2025-01-07 PROCEDURE — 96365 THER/PROPH/DIAG IV INF INIT: CPT

## 2025-01-07 PROCEDURE — 81001 URINALYSIS AUTO W/SCOPE: CPT | Performed by: STUDENT IN AN ORGANIZED HEALTH CARE EDUCATION/TRAINING PROGRAM

## 2025-01-07 PROCEDURE — 99285 EMERGENCY DEPT VISIT HI MDM: CPT | Mod: 25 | Performed by: STUDENT IN AN ORGANIZED HEALTH CARE EDUCATION/TRAINING PROGRAM

## 2025-01-07 PROCEDURE — 85610 PROTHROMBIN TIME: CPT

## 2025-01-07 RX ORDER — AMOXICILLIN AND CLAVULANATE POTASSIUM 875; 125 MG/1; MG/1
1 TABLET, FILM COATED ORAL EVERY 12 HOURS
Qty: 14 TABLET | Refills: 0 | Status: SHIPPED | OUTPATIENT
Start: 2025-01-07 | End: 2025-01-07

## 2025-01-07 RX ORDER — CEFTRIAXONE 2 G/50ML
2 INJECTION, SOLUTION INTRAVENOUS ONCE
Status: COMPLETED | OUTPATIENT
Start: 2025-01-07 | End: 2025-01-07

## 2025-01-07 RX ORDER — AMOXICILLIN AND CLAVULANATE POTASSIUM 875; 125 MG/1; MG/1
1 TABLET, FILM COATED ORAL EVERY 12 HOURS
Qty: 14 TABLET | Refills: 0 | Status: SHIPPED | OUTPATIENT
Start: 2025-01-07 | End: 2025-01-14

## 2025-01-07 RX ADMIN — SODIUM CHLORIDE, POTASSIUM CHLORIDE, SODIUM LACTATE AND CALCIUM CHLORIDE 1000 ML: 600; 310; 30; 20 INJECTION, SOLUTION INTRAVENOUS at 13:51

## 2025-01-07 RX ADMIN — CEFTRIAXONE SODIUM 2 G: 2 INJECTION, SOLUTION INTRAVENOUS at 13:49

## 2025-01-07 RX ADMIN — AZITHROMYCIN MONOHYDRATE 500 MG: 500 INJECTION, POWDER, LYOPHILIZED, FOR SOLUTION INTRAVENOUS at 14:27

## 2025-01-07 ASSESSMENT — CURB65 SCORE
HAS CONFUSION: YES
BUN IS GREATER THAN 19 MG/DL: NO
AGE IS GREATER THAN OR EQUAL TO 65: YES
CURB65 SCORE: 2
RESPIRATORY RATE GREATER THAN OR EQUAL TO 30: NO
SBP LESS THAN 90 OR DBNP LESS THAN 60: NO

## 2025-01-07 ASSESSMENT — LIFESTYLE VARIABLES
TOTAL SCORE: 0
EVER HAD A DRINK FIRST THING IN THE MORNING TO STEADY YOUR NERVES TO GET RID OF A HANGOVER: NO
EVER FELT BAD OR GUILTY ABOUT YOUR DRINKING: NO
HAVE PEOPLE ANNOYED YOU BY CRITICIZING YOUR DRINKING: NO
HAVE YOU EVER FELT YOU SHOULD CUT DOWN ON YOUR DRINKING: NO

## 2025-01-07 ASSESSMENT — PAIN SCALES - PAIN ASSESSMENT IN ADVANCED DEMENTIA (PAINAD)
FACIALEXPRESSION: SMILING OR INEXPRESSIVE
BREATHING: OCCASIONAL LABORED BREATHING, SHORT PERIOD OF HYPERVENTILATION
TOTALSCORE: REPOSITIONED
CONSOLABILITY: NO NEED TO CONSOLE
BODYLANGUAGE: RELAXED
TOTALSCORE: 1

## 2025-01-07 ASSESSMENT — COLUMBIA-SUICIDE SEVERITY RATING SCALE - C-SSRS
6. HAVE YOU EVER DONE ANYTHING, STARTED TO DO ANYTHING, OR PREPARED TO DO ANYTHING TO END YOUR LIFE?: NO
1. IN THE PAST MONTH, HAVE YOU WISHED YOU WERE DEAD OR WISHED YOU COULD GO TO SLEEP AND NOT WAKE UP?: NO
2. HAVE YOU ACTUALLY HAD ANY THOUGHTS OF KILLING YOURSELF?: NO

## 2025-01-07 ASSESSMENT — PAIN SCALES - GENERAL: PAINLEVEL_OUTOF10: 0 - NO PAIN

## 2025-01-07 ASSESSMENT — PAIN - FUNCTIONAL ASSESSMENT: PAIN_FUNCTIONAL_ASSESSMENT: PAINAD (PAIN ASSESSMENT IN ADVANCED DEMENTIA SCALE)

## 2025-01-07 NOTE — PROGRESS NOTES
Received call from [facility].  Pt is from [facility], memory care.  Eventual plan is transition to [facility].

## 2025-01-07 NOTE — ED PROVIDER NOTES
EMERGENCY DEPARTMENT ENCOUNTER      Pt Name: Arnoldo Madison  MRN: [id number]  Birthdate 1950  Date of evaluation: 1/7/2025  Provider: Ralph Candelaria MD    CHIEF COMPLAINT       Chief Complaint   Patient presents with    Altered Mental Status     Pt sent in by SNF for a steady decline in mental status over the past 2 days. Pt arrived nonverbal and reponding only to pain. Breathing is labored and noisy.      HISTORY OF PRESENT ILLNESS    HPI  Patient sent in by SNF for steady decline altered mental status.  Patient is breathing labored and noisy according to EMS.  They indicate that he is off his baseline from mom.  The patient does have  Is a past medical history of unspecified convulsion nonrheumatic aortic valve disorder, dissection of an unspecified site of the aorta.  The patient is following commands he denies any pain on examination.  Nursing Notes were reviewed.    PAST MEDICAL HISTORY     Past Medical History:   Diagnosis Date    Hypertension     Marfan syndrome ([facility])     Seizures (Multi)     Stroke (Multi)          SURGICAL HISTORY       Past Surgical History:   Procedure Laterality Date    CARDIAC VALVE REPLACEMENT           CURRENT MEDICATIONS       Previous Medications    No medications on file       ALLERGIES     Oxycodone    FAMILY HISTORY     No family history on file.       SOCIAL HISTORY       Social History     Socioeconomic History    Marital status: Single   Tobacco Use    Smoking status: Former     Types: Cigarettes    Smokeless tobacco: Never   Substance and Sexual Activity    Alcohol use: Not Currently    Drug use: Never     Social Drivers of Health     Financial Resource Strain: Patient Declined (10/23/2024)    Received from [facility]    Overall Financial Resource Strain (CARDIA)     Difficulty of Paying Living Expenses: Patient declined   Food Insecurity: No Food Insecurity (11/18/2024)    Received from [facility]    Hunger Vital Sign     Worried About Running Out of Food  in the Last Year: Never true     Ran Out of Food in the Last Year: Never true   Transportation Needs: No Transportation Needs (11/18/2024)    Received from [facility]    PRAPARE - Transportation     Lack of Transportation (Medical): No     Lack of Transportation (Non-Medical): No   Physical Activity: Inactive (10/23/2024)    Received from [facility]    Exercise Vital Sign     Days of Exercise per Week: 0 days     Minutes of Exercise per Session: 0 min   Stress: No Stress Concern Present (10/23/2024)    Received from [facility]    [employer] [employer] - Occupational Stress Questionnaire     Feeling of Stress : Only a little   Social Connections: Unknown (10/23/2024)    Received from [facility]    Social Connection and Isolation Panel [NHANES]     Frequency of Communication with Friends and Family: Once a week     Frequency of Social Gatherings with Friends and Family: Once a week     Attends [employer] Services: Patient declined     Active Member of Clubs or Organizations: No     Attends Club or Organization Meetings: Patient declined     Marital Status: Never    Housing Stability: Low Risk  (11/18/2024)    Received from [facility]    Housing Stability Vital Sign     Unable to Pay for Housing in the Last Year: No     Number of Times Moved in the Last Year: 0     Homeless in the Last Year: No       SCREENINGS                        PHYSICAL EXAM    (up to 7 for level 4, 8 or more for level 5)     ED Triage Vitals [01/07/25 1149]   Temperature Heart Rate Respirations BP   36.6 °C (97.9 °F) 62 (!) 24 112/70      Pulse Ox Temp Source Heart Rate Source Patient Position   96 % Temporal Monitor Lying      BP Location FiO2 (%)     Left arm --       Physical Exam  Constitutional:       Appearance: He is ill-appearing (Chronically).   HENT:      Head: Normocephalic.   Eyes:      Pupils: Pupils are equal, round, and reactive to light.   Cardiovascular:      Rate and  Rhythm: Normal rate.   Pulmonary:      Comments: Diffuse coarse breath sounds, sonorous breathing  Abdominal:      Palpations: Abdomen is soft.   Musculoskeletal:         General: Normal range of motion.      Cervical back: Normal range of motion and neck supple.   Skin:     General: Skin is warm.      Capillary Refill: Capillary refill takes less than 2 seconds.   Neurological:      Mental Status: He is disoriented.      Comments: At his baseline per family   Psychiatric:         Mood and Affect: Mood normal.          DIAGNOSTIC RESULTS     LABS:  Labs Reviewed   BLOOD CULTURE   BLOOD CULTURE   CBC WITH AUTO DIFFERENTIAL   COMPREHENSIVE METABOLIC PANEL   TROPONIN SERIES- (INITIAL, 1 HR)    Narrative:     The following orders were created for panel order Troponin I Series, High Sensitivity (0, 1 HR).  Procedure                               Abnormality         Status                     ---------                               -----------         ------                     Troponin I, High Sensiti...[[id number]]                                                 Troponin, High Sensitivi...[[id number]]                                                   Please view results for these tests on the individual orders.   B-TYPE NATRIURETIC PEPTIDE   URINALYSIS WITH REFLEX CULTURE AND MICROSCOPIC    Narrative:     The following orders were created for panel order Urinalysis with Reflex Culture and Microscopic.  Procedure                               Abnormality         Status                     ---------                               -----------         ------                     Urinalysis with Reflex C...[[id number]]                                                 Extra Urine Gray Tube[[id number]]                                                         Please view results for these tests on the individual orders.   SARS-COV-2 AND INFLUENZA A/B PCR   SERIAL TROPONIN-INITIAL   URINALYSIS WITH REFLEX CULTURE AND MICROSCOPIC   EXTRA  URINE GRAY TUBE   LACTATE   PROTIME-INR   APTT   LAMOTRIGINE   SERIAL TROPONIN, 1 HOUR       All other labs were within normal range or not returned as of this dictation.    Imaging  XR chest 1 view    (Results Pending)   CT head wo IV contrast    (Results Pending)        Procedures  Procedures     EMERGENCY DEPARTMENT COURSE/MDM:   Medical Decision Making  74-year-old male presents emergency department chief plaint of altered mental status.  Patient is DNR CC.    The level of diagnostic and therapeutic evaluation was discussed with the family.  They were agreeable to blood work,  imaging, antibiotics.  Medical management treatment emergency department will consist of CT head, chest x-ray and basic labs.  CT of the head shows no acute concerning findings.  Chest x-ray reveals left lower lobe pneumonia.  The patient will be started on IV antibiotics here we have sent Augmentin to his local pharmacy.  Patient's labs are otherwise unremarkable.  The patient will be discharged back to his facility with strict return precautions.    ED Course as of 01/11/25 1107   Tue Jan 07, 2025   1316 IMPRESSION:  Suspected new focus retrocardiac left lower lobe pneumonia.  We have started antibiotics azithromycin and Rocephin. [DS]   1619 Curb 65 score is 2  Will discuss with family regarding inpatient versus outpatient treatment    Per sister patient is feeling better since receiving IV fluids.  He is becoming more awake [FN]      ED Course User Index  [DS] Ralph Candelaria MD  [FN] Dylon Franco MD         Diagnoses as of 01/11/25 1107   Altered mental status, unspecified altered mental status type        Patient and or family in agreement and understanding of treatment plan.  All questions answered.      I reviewed the case with the attending ED physician. The attending ED physician agrees with the plan. Patient and/or patient´s representative was counseled regarding labs, imaging, likely diagnosis, and plan. All questions were  answered.    ED Medications administered this visit:  Medications - No data to display    New Prescriptions from this visit:    New Prescriptions    No medications on file       Follow-up:  No follow-up provider specified.      Final Impression: No diagnosis found.      (Please note that portions of this note were completed with a voice recognition program.  Efforts were made to edit the dictations but occasionally words are mis-transcribed.)     Ralph Candelaria MD  Resident  01/07/25 1508       Dylon Franco MD  01/11/25 5214

## 2025-01-07 NOTE — ED NOTES
Pt not responding verbally at this time but did shake his head no.      Arely Calix RN  01/07/25 [id number]

## 2025-01-07 NOTE — DISCHARGE INSTRUCTIONS
You are seen in the emergency department today for evaluation of altered mental status.  It was found that you have pneumonia.  Please follow-up with your primary care provider within the week.  We have sent Augmentin to your local pharmacy please pick it up and take it as indicated.  If you develop any worsening mental status worsening shortness of breath please return back to the emergency department as soon as possible.

## 2025-01-08 VITALS
TEMPERATURE: 98.2 F | WEIGHT: 199 LBS | HEART RATE: 104 BPM | DIASTOLIC BLOOD PRESSURE: 72 MMHG | HEIGHT: 74 IN | BODY MASS INDEX: 25.54 KG/M2 | OXYGEN SATURATION: 92 % | SYSTOLIC BLOOD PRESSURE: 106 MMHG | RESPIRATION RATE: 24 BRPM

## 2025-01-08 LAB
ATRIAL RATE: 277 BPM
HOLD SPECIMEN: NORMAL
Q ONSET: 210 MS
QRS COUNT: 17 BEATS
QRS DURATION: 106 MS
QT INTERVAL: 366 MS
QTC CALCULATION(BAZETT): 481 MS
QTC FREDERICIA: 439 MS
R AXIS: 47 DEGREES
T AXIS: 37 DEGREES
T OFFSET: 393 MS
VENTRICULAR RATE: 104 BPM

## 2025-01-11 LAB
BACTERIA BLD CULT: NORMAL
BACTERIA BLD CULT: NORMAL